# Patient Record
Sex: MALE | Race: BLACK OR AFRICAN AMERICAN | NOT HISPANIC OR LATINO | Employment: FULL TIME | URBAN - METROPOLITAN AREA
[De-identification: names, ages, dates, MRNs, and addresses within clinical notes are randomized per-mention and may not be internally consistent; named-entity substitution may affect disease eponyms.]

---

## 2022-02-16 ENCOUNTER — OFFICE VISIT (OUTPATIENT)
Dept: URGENT CARE | Facility: CLINIC | Age: 52
End: 2022-02-16
Payer: MEDICAID

## 2022-02-16 ENCOUNTER — APPOINTMENT (OUTPATIENT)
Dept: RADIOLOGY | Facility: IMAGING CENTER | Age: 52
End: 2022-02-16
Attending: PHYSICIAN ASSISTANT
Payer: MEDICAID

## 2022-02-16 VITALS
HEIGHT: 70 IN | HEART RATE: 83 BPM | RESPIRATION RATE: 20 BRPM | DIASTOLIC BLOOD PRESSURE: 80 MMHG | WEIGHT: 261.2 LBS | SYSTOLIC BLOOD PRESSURE: 120 MMHG | OXYGEN SATURATION: 98 % | TEMPERATURE: 97.6 F | BODY MASS INDEX: 37.39 KG/M2

## 2022-02-16 DIAGNOSIS — Z78.9 MALE-TO-FEMALE TRANSGENDER PERSON: ICD-10-CM

## 2022-02-16 DIAGNOSIS — J98.8 RTI (RESPIRATORY TRACT INFECTION): ICD-10-CM

## 2022-02-16 DIAGNOSIS — J44.1 COPD WITH ACUTE EXACERBATION (HCC): ICD-10-CM

## 2022-02-16 DIAGNOSIS — G47.30 SLEEP APNEA, UNSPECIFIED TYPE: ICD-10-CM

## 2022-02-16 DIAGNOSIS — M65.331 TRIGGER MIDDLE FINGER OF RIGHT HAND: ICD-10-CM

## 2022-02-16 PROCEDURE — 99204 OFFICE O/P NEW MOD 45 MIN: CPT | Performed by: PHYSICIAN ASSISTANT

## 2022-02-16 PROCEDURE — 71046 X-RAY EXAM CHEST 2 VIEWS: CPT | Mod: TC | Performed by: PHYSICIAN ASSISTANT

## 2022-02-16 RX ORDER — METHYLPREDNISOLONE 4 MG/1
TABLET ORAL
Qty: 21 TABLET | Refills: 0 | Status: SHIPPED | OUTPATIENT
Start: 2022-02-16 | End: 2022-04-29

## 2022-02-16 RX ORDER — DOXYCYCLINE HYCLATE 100 MG
100 TABLET ORAL 2 TIMES DAILY
Qty: 14 TABLET | Refills: 0 | Status: SHIPPED | OUTPATIENT
Start: 2022-02-16 | End: 2022-02-23

## 2022-02-16 ASSESSMENT — ENCOUNTER SYMPTOMS
NAUSEA: 0
SINUS PAIN: 0
ABDOMINAL PAIN: 0
SORE THROAT: 0
MYALGIAS: 0
VOMITING: 0
COUGH: 1
HEADACHES: 0
WHEEZING: 0
CHILLS: 0
HEMOPTYSIS: 0
DIARRHEA: 0
FEVER: 0
SHORTNESS OF BREATH: 1
RHINORRHEA: 0
SPUTUM PRODUCTION: 1

## 2022-02-16 ASSESSMENT — COPD QUESTIONNAIRES: COPD: 1

## 2022-02-16 NOTE — PROGRESS NOTES
Subjective     Mikal Aguero is a 51 y.o. adult who presents with Cough (Dry throat x 2 mos/) and Hand Pain ((R) hand, hard to bend x 1 year/)            Cough  This is a new problem. Episode onset: > 2 months  The problem has been gradually worsening. The cough is productive of purulent sputum (green, brown sputum). Associated symptoms include shortness of breath. Pertinent negatives include no chest pain, chills, ear pain, fever, headaches, hemoptysis, myalgias, nasal congestion, rash, rhinorrhea, sore throat or wheezing. Nothing aggravates the symptoms. Risk factors for lung disease include smoking/tobacco exposure. Treatments tried: started antibiotics Fiance had. The treatment provided mild relief. Mikal Aguero's past medical history is significant for COPD.     The patient also reports hand pain in his right middle finger for over 1 year. He reports history of being diagnosed with trigger finger. Finger will get stuck and become very painful. No known Injury. No numbness or tingling.    The patient also requested estrogen pills. He is male-female transgender and is interested in estrogen medication.     No past medical history on file.  No past surgical history on file.    No family history on file.    No Known Allergies    Medications, Allergies, and current problem list reviewed today in Epic      Review of Systems   Constitutional: Negative for chills, fever and malaise/fatigue.   HENT: Negative for congestion, ear pain, rhinorrhea, sinus pain and sore throat.    Respiratory: Positive for cough, sputum production and shortness of breath. Negative for hemoptysis and wheezing.         Night awakening    Cardiovascular: Negative for chest pain and leg swelling.   Gastrointestinal: Negative for abdominal pain, diarrhea, nausea and vomiting.   Musculoskeletal: Positive for joint pain (right middle finger pain  and locking ). Negative for myalgias.   Skin: Negative for rash.   Neurological: Negative for headaches.  "    All other systems reviewed and are negative.              Objective     /80   Pulse 83   Temp 36.4 °C (97.6 °F)   Resp 20   Ht 1.778 m (5' 10\")   Wt 118 kg (261 lb 3.2 oz)   SpO2 98%   BMI 37.48 kg/m²      Physical Exam  Constitutional:       General: Mikal Aguero is not in acute distress.     Appearance: Mikal Aguero is not ill-appearing.   HENT:      Head: Normocephalic and atraumatic.      Nose: Nose normal.      Mouth/Throat:      Mouth: Mucous membranes are moist.      Pharynx: No posterior oropharyngeal erythema.   Eyes:      Conjunctiva/sclera: Conjunctivae normal.   Cardiovascular:      Rate and Rhythm: Normal rate and regular rhythm.      Heart sounds: Normal heart sounds.   Pulmonary:      Effort: Pulmonary effort is normal. No respiratory distress.      Breath sounds: Normal breath sounds. No wheezing, rhonchi or rales.   Chest:      Chest wall: No tenderness.   Musculoskeletal:        Hands:    Skin:     General: Skin is warm and dry.   Neurological:      General: No focal deficit present.      Mental Status: Mikal Aguero is alert and oriented to person, place, and time.   Psychiatric:         Behavior: Behavior normal.         Thought Content: Thought content normal.         Judgment: Judgment normal.                Narrative & Impression     2/16/2022 10:24 AM     HISTORY/REASON FOR EXAM:  Shortness of breath     TECHNIQUE/EXAM DESCRIPTION:  PA and lateral views of the chest.     COMPARISON:  None     FINDINGS:     The heart is within normal limits in size. Atherosclerotic calcification is seen.  No focal consolidation, pleural effusion or pneumothorax is identified.  Costophrenic angles are clear.     IMPRESSION:     1.  No acute cardiopulmonary process is identified.  2.  Atherosclerotic plaque.             Assessment & Plan        1. COPD with acute exacerbation (HCC)  DX-CHEST-2 VIEWS    doxycycline (VIBRAMYCIN) 100 MG Tab    methylPREDNISolone (MEDROL DOSEPAK) 4 MG Tablet Therapy " Pack   2. Sleep apnea, unspecified type  Referral to Pulmonary and Sleep Medicine   3. Trigger middle finger of right hand  Referral to Orthopedics    methylPREDNISolone (MEDROL DOSEPAK) 4 MG Tablet Therapy Pack   4. Male-to-female transgender person  Referral to Endocrinology       X-RAY reviewed. Agree with RAD above.  RX Doxycycline and Medrol for COPD Exacerbation.   Advised patient to establish with PCP- number given.         Differential diagnoses, Supportive care, and indications for immediate follow-up discussed with patient.   Pathogenesis of diagnosis discussed including typical length and natural progression.   Instructed to return to clinic or nearest emergency department for any change in condition, further concerns, or worsening of symptoms.    The patient demonstrated a good understanding and agreed with the treatment plan.    Marilyn Fernandez P.A.-C.

## 2022-03-13 ENCOUNTER — APPOINTMENT (OUTPATIENT)
Dept: RADIOLOGY | Facility: IMAGING CENTER | Age: 52
End: 2022-03-13
Attending: NURSE PRACTITIONER
Payer: MEDICAID

## 2022-03-13 ENCOUNTER — OFFICE VISIT (OUTPATIENT)
Dept: URGENT CARE | Facility: CLINIC | Age: 52
End: 2022-03-13
Payer: MEDICAID

## 2022-03-13 VITALS
DIASTOLIC BLOOD PRESSURE: 78 MMHG | BODY MASS INDEX: 36.71 KG/M2 | HEIGHT: 71 IN | WEIGHT: 262.2 LBS | RESPIRATION RATE: 16 BRPM | OXYGEN SATURATION: 96 % | TEMPERATURE: 98.1 F | SYSTOLIC BLOOD PRESSURE: 136 MMHG | HEART RATE: 98 BPM

## 2022-03-13 DIAGNOSIS — M65.331 TRIGGER FINGER, RIGHT MIDDLE FINGER: ICD-10-CM

## 2022-03-13 DIAGNOSIS — M79.641 RIGHT HAND PAIN: ICD-10-CM

## 2022-03-13 PROCEDURE — 99214 OFFICE O/P EST MOD 30 MIN: CPT | Performed by: NURSE PRACTITIONER

## 2022-03-13 PROCEDURE — 73130 X-RAY EXAM OF HAND: CPT | Mod: TC,RT | Performed by: NURSE PRACTITIONER

## 2022-03-13 RX ORDER — PREDNISONE 20 MG/1
60 TABLET ORAL DAILY
Qty: 15 TABLET | Refills: 0 | Status: SHIPPED | OUTPATIENT
Start: 2022-03-13 | End: 2022-03-18

## 2022-03-13 ASSESSMENT — VISUAL ACUITY: OU: 1

## 2022-03-13 ASSESSMENT — ENCOUNTER SYMPTOMS
NEUROLOGICAL NEGATIVE: 1
CONSTITUTIONAL NEGATIVE: 1

## 2022-03-13 NOTE — PATIENT INSTRUCTIONS
Details    Reading Physician Reading Date Result Priority   Kamilah Neri D.O.  [phone] 3/13/2022 Urgent Care     Narrative & Impression     3/13/2022 11:39 AM     HISTORY/REASON FOR EXAM:  Pain/locking to the third finger..        TECHNIQUE/EXAM DESCRIPTION AND NUMBER OF VIEWS:  3 views of the  RIGHT hand.     COMPARISON: None     FINDINGS:     MINERALIZATION: Mineralization is unremarkable for age.     INJURY: No acute fracture or gross malalignment is seen.     JOINTS: No erosive arthropathy is evident.           IMPRESSION:     No radiographic evidence of acute osseous abnormality             Exam Ended: 03/13/22 11:46 AM Last Resulted: 03/13/22 11:48 AM               Trigger Finger    Trigger finger (stenosing tenosynovitis) is a condition that causes a finger to get stuck in a bent position. Each finger has a tough, cord-like tissue that connects muscle to bone (tendon), and each tendon is surrounded by a tunnel of tissue (tendon sheath). To move your finger, your tendon needs to slide freely through the sheath. Trigger finger happens when the tendon or the sheath thickens, making it difficult to move your finger.  Trigger finger can affect any finger or a thumb. It may affect more than one finger. Mild cases may clear up with rest and medicine. Severe cases require more treatment.  What are the causes?  Trigger finger is caused by a thickened finger tendon or tendon sheath. The cause of this thickening is not known.  What increases the risk?  The following factors may make you more likely to develop this condition:  · Doing activities that require a strong .  · Having rheumatoid arthritis, gout, or diabetes.  · Being 40-60 years old.  · Being a woman.  What are the signs or symptoms?  Symptoms of this condition include:  · Pain when bending or straightening your finger.  · Tenderness or swelling where your finger attaches to the palm of your hand.  · A lump in the palm of your hand or on the  inside of your finger.  · Hearing a popping sound when you try to straighten your finger.  · Feeling a popping, catching, or locking sensation when you try to straighten your finger.  · Being unable to straighten your finger.  How is this diagnosed?  This condition is diagnosed based on your symptoms and a physical exam.  How is this treated?  This condition may be treated by:  · Resting your finger and avoiding activities that make symptoms worse.  · Wearing a finger splint to keep your finger in a slightly bent position.  · Taking NSAIDs to relieve pain and swelling.  · Injecting medicine (steroids) into the tendon sheath to reduce swelling and irritation. Injections may need to be repeated.  · Having surgery to open the tendon sheath. This may be done if other treatments do not work and you cannot straighten your finger. You may need physical therapy after surgery.  Follow these instructions at home:    · Use moist heat to help reduce pain and swelling as told by your health care provider.  · Rest your finger and avoid activities that make pain worse. Return to normal activities as told by your health care provider.  · If you have a splint, wear it as told by your health care provider.  · Take over-the-counter and prescription medicines only as told by your health care provider.  · Keep all follow-up visits as told by your health care provider. This is important.  Contact a health care provider if:  · Your symptoms are not improving with home care.  Summary  · Trigger finger (stenosing tenosynovitis) causes your finger to get stuck in a bent position, and it can make it difficult and painful to straighten your finger.  · This condition develops when a finger tendon or tendon sheath thickens.  · Treatment starts with resting, wearing a splint, and taking NSAIDs.  · In severe cases, surgery to open the tendon sheath may be needed.  This information is not intended to replace advice given to you by your health care  provider. Make sure you discuss any questions you have with your health care provider.  Document Released: 10/07/2005 Document Revised: 11/30/2018 Document Reviewed: 11/28/2017  Elsevier Patient Education © 2020 [employer]        A referral request has been placed for hand surgery. We have a referrals department that is tasked with locating a suitable office that currently accepts patients and your insurance and you should be receiving referral information from them such as the office name, address, and number. This process usually takes around 3 business days. If you are not contacted by our referrals department, please call [phone].

## 2022-03-13 NOTE — PROGRESS NOTES
Subjective:     Mikal Aguero is a 51 y.o. adult who presents for Hand Pain (Not in the center of the hand, can't fully extend middle finger, throbbing pain, hurts to the touch x 1 year)       Other  This is a new problem. The problem has been gradually worsening.     Patient reports that for the past 1 year, patient started to develop pain, tenderness at the palm of the right hand at the base of the right middle finger.  Is worsening.  Has not been evaluated previously.  Prior therapy: None.  Right-hand-dominant.  Reports it is difficult to wipe self due to the pain.    Patient was screened prior to rooming and denied COVID-19 diagnosis or contact with a person who has been diagnosed or is suspected to have COVID-19. During this visit, appropriate PPE was worn, hand hygiene was performed, and the patient and any visitors were masked.     PMH:  has no past medical history on file.    MEDS:   Current Outpatient Medications:   •  predniSONE (DELTASONE) 20 MG Tab, Take 3 Tablets by mouth every day for 5 days., Disp: 15 Tablet, Rfl: 0  •  methylPREDNISolone (MEDROL DOSEPAK) 4 MG Tablet Therapy Pack, Follow schedule on package instructions. (Patient not taking: Reported on 3/13/2022), Disp: 21 Tablet, Rfl: 0    ALLERGIES: No Known Allergies    SURGHX: History reviewed. No pertinent surgical history.    SOCHX:  reports that Mikal Aguero has been smoking cigarettes. Mikal Ageuro has never used smokeless tobacco. Mikal Aguero reports current alcohol use. Mikal Aguero reports current drug use. Drugs: Marijuana and Inhaled.     FH: Reviewed with patient, not pertinent to this visit.    Review of Systems   Constitutional: Negative.    Musculoskeletal:        Right hand pain, tenderness at palm towards 3rd finger   Neurological: Negative.    All other systems reviewed and are negative.    Additional details per HPI.      Objective:     /78 (BP Location: Left arm, Patient Position: Sitting, BP Cuff Size: Large adult)   Pulse 98  "  Temp 36.7 °C (98.1 °F) (Temporal)   Resp 16   Ht 1.803 m (5' 11\")   Wt 119 kg (262 lb 3.2 oz)   SpO2 96%   BMI 36.57 kg/m²     Physical Exam  Vitals reviewed.   Constitutional:       General: Mikal Aguero is not in acute distress.     Appearance: Mikal Aguero is well-developed. Mikal Aguero is not ill-appearing or toxic-appearing.   Eyes:      General: Vision grossly intact.      Extraocular Movements: Extraocular movements intact.   Cardiovascular:      Rate and Rhythm: Normal rate.   Pulmonary:      Effort: Pulmonary effort is normal. No respiratory distress.   Musculoskeletal:         General: No deformity.      Right hand: Tenderness (Over volar aspect of right 3rd MCPJ) present. No swelling or deformity. Decreased range of motion (Right middle finger with flexion, due to pain). Normal strength. Normal sensation. Normal capillary refill.      Cervical back: Normal range of motion.   Skin:     General: Skin is warm and dry.      Capillary Refill: Capillary refill takes less than 2 seconds.      Coloration: Skin is not pale.   Neurological:      Mental Status: Mikal Aguero is alert and oriented to person, place, and time.      Sensory: No sensory deficit.      Motor: No weakness.   Psychiatric:         Behavior: Behavior normal. Behavior is cooperative.     X-ray of right hand:    Details    Reading Physician Reading Date Result Priority   Kamilah Neri D.O.  [phone] 3/13/2022 Urgent Care     Narrative & Impression     3/13/2022 11:39 AM     HISTORY/REASON FOR EXAM:  Pain/locking to the third finger..    TECHNIQUE/EXAM DESCRIPTION AND NUMBER OF VIEWS:  3 views of the  RIGHT hand.     COMPARISON: None     FINDINGS:     MINERALIZATION: Mineralization is unremarkable for age.     INJURY: No acute fracture or gross malalignment is seen.     JOINTS: No erosive arthropathy is evident.    IMPRESSION:     No radiographic evidence of acute osseous abnormality           Exam Ended: 03/13/22 11:46 AM Last " Resulted: 03/13/22 11:48 AM           Radiology report and images reviewed by myself. Concur with findings.      Assessment/Plan:     1. Right hand pain  - DX-HAND 3+ RIGHT; Future  - predniSONE (DELTASONE) 20 MG Tab; Take 3 Tablets by mouth every day for 5 days.  Dispense: 15 Tablet; Refill: 0  - Referral to Hand Surgery    2. Trigger finger, right middle finger  - predniSONE (DELTASONE) 20 MG Tab; Take 3 Tablets by mouth every day for 5 days.  Dispense: 15 Tablet; Refill: 0  - Referral to Hand Surgery    Rx as above sent electronically. Rest, ice/heat, elevate. Follow up with hand surgery for evaluation of right middle finger pain, stiffness; possible trigger finger/    Differential diagnosis, natural history, supportive care, over-the-counter symptom management per 's instructions, close monitoring, and indications for immediate follow-up discussed.     Warning signs reviewed. Return precautions discussed.     All questions answered. Patient agrees with the plan of care.    Discharge summary provided.

## 2022-04-20 ENCOUNTER — APPOINTMENT (OUTPATIENT)
Dept: RADIOLOGY | Facility: MEDICAL CENTER | Age: 52
End: 2022-04-20
Attending: EMERGENCY MEDICINE
Payer: MEDICAID

## 2022-04-20 ENCOUNTER — APPOINTMENT (OUTPATIENT)
Dept: RADIOLOGY | Facility: MEDICAL CENTER | Age: 52
End: 2022-04-20
Payer: MEDICAID

## 2022-04-20 ENCOUNTER — HOSPITAL ENCOUNTER (EMERGENCY)
Facility: MEDICAL CENTER | Age: 52
End: 2022-04-20
Attending: EMERGENCY MEDICINE
Payer: MEDICAID

## 2022-04-20 VITALS
HEART RATE: 97 BPM | HEIGHT: 71 IN | SYSTOLIC BLOOD PRESSURE: 136 MMHG | OXYGEN SATURATION: 97 % | BODY MASS INDEX: 36.4 KG/M2 | WEIGHT: 260 LBS | TEMPERATURE: 98.2 F | RESPIRATION RATE: 20 BRPM | DIASTOLIC BLOOD PRESSURE: 89 MMHG

## 2022-04-20 DIAGNOSIS — S80.12XA CONTUSION OF MULTIPLE SITES OF LEFT LOWER EXTREMITY, INITIAL ENCOUNTER: ICD-10-CM
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
## 2022-04-20 DIAGNOSIS — T14.8XXA ABRASION: ICD-10-CM

## 2022-04-20 PROBLEM — T14.90XA TRAUMA: Status: ACTIVE | Noted: 2022-04-20

## 2022-04-20 PROBLEM — Z11.52 ENCOUNTER FOR SCREENING FOR COVID-19: Status: ACTIVE | Noted: 2022-04-20

## 2022-04-20 PROBLEM — Z78.9 NO CONTRAINDICATION TO DEEP VEIN THROMBOSIS (DVT) PROPHYLAXIS: Status: ACTIVE | Noted: 2022-04-20

## 2022-04-20 PROBLEM — R73.9 HYPERGLYCEMIA: Status: ACTIVE | Noted: 2022-04-20

## 2022-04-20 PROBLEM — S87.80XA: Status: ACTIVE | Noted: 2022-04-20

## 2022-04-20 PROBLEM — R55 VASOVAGAL SYNCOPE: Status: ACTIVE | Noted: 2022-04-20

## 2022-04-20 LAB
ABO + RH BLD: NORMAL
ABO GROUP BLD: NORMAL
ALBUMIN SERPL BCP-MCNC: 4.4 G/DL (ref 3.2–4.9)
ALBUMIN/GLOB SERPL: 1.5 G/DL
ALP SERPL-CCNC: 88 U/L (ref 30–99)
ALT SERPL-CCNC: 20 U/L (ref 2–50)
ANION GAP SERPL CALC-SCNC: 14 MMOL/L (ref 7–16)
APTT PPP: <20 SEC (ref 24.7–36)
AST SERPL-CCNC: 23 U/L (ref 12–45)
BILIRUB SERPL-MCNC: 0.3 MG/DL (ref 0.1–1.5)
BLD GP AB SCN SERPL QL: NORMAL
BUN SERPL-MCNC: 12 MG/DL (ref 8–22)
CALCIUM SERPL-MCNC: 8.8 MG/DL (ref 8.5–10.5)
CHLORIDE SERPL-SCNC: 102 MMOL/L (ref 96–112)
CK SERPL-CCNC: 303 U/L (ref 0–154)
CK SERPL-CCNC: 324 U/L (ref 0–154)
CO2 SERPL-SCNC: 21 MMOL/L (ref 20–33)
CREAT SERPL-MCNC: 0.98 MG/DL (ref 0.5–1.4)
EKG IMPRESSION: NORMAL
ERYTHROCYTE [DISTWIDTH] IN BLOOD BY AUTOMATED COUNT: 41.1 FL (ref 35.9–50)
EST. AVERAGE GLUCOSE BLD GHB EST-MCNC: 157 MG/DL
ETHANOL BLD-MCNC: <10.1 MG/DL (ref 0–10)
GFR SERPLBLD CREATININE-BSD FMLA CKD-EPI: 93 ML/MIN/1.73 M 2
GLOBULIN SER CALC-MCNC: 2.9 G/DL (ref 1.9–3.5)
GLUCOSE SERPL-MCNC: 240 MG/DL (ref 65–99)
HBA1C MFR BLD: 7.1 % (ref 4–5.6)
HCT VFR BLD AUTO: 47.4 % (ref 42–52)
HGB BLD-MCNC: 15.7 G/DL (ref 14–18)
INR PPP: 1.07 (ref 0.87–1.13)
MCH RBC QN AUTO: 28.7 PG (ref 27–33)
MCHC RBC AUTO-ENTMCNC: 33.1 G/DL (ref 33.7–35.3)
MCV RBC AUTO: 86.7 FL (ref 81.4–97.8)
PLATELET # BLD AUTO: 264 K/UL (ref 164–446)
PMV BLD AUTO: 9.9 FL (ref 9–12.9)
POTASSIUM SERPL-SCNC: 4.5 MMOL/L (ref 3.6–5.5)
PROT SERPL-MCNC: 7.3 G/DL (ref 6–8.2)
PROTHROMBIN TIME: 13.6 SEC (ref 12–14.6)
RBC # BLD AUTO: 5.47 M/UL (ref 4.7–6.1)
RH BLD: NORMAL
SODIUM SERPL-SCNC: 137 MMOL/L (ref 135–145)
WBC # BLD AUTO: 8.9 K/UL (ref 4.8–10.8)

## 2022-04-20 PROCEDURE — 90471 IMMUNIZATION ADMIN: CPT

## 2022-04-20 PROCEDURE — 700102 HCHG RX REV CODE 250 W/ 637 OVERRIDE(OP): Performed by: EMERGENCY MEDICINE

## 2022-04-20 PROCEDURE — 82550 ASSAY OF CK (CPK): CPT

## 2022-04-20 PROCEDURE — 85610 PROTHROMBIN TIME: CPT

## 2022-04-20 PROCEDURE — 99284 EMERGENCY DEPT VISIT MOD MDM: CPT | Performed by: SURGERY

## 2022-04-20 PROCEDURE — 72170 X-RAY EXAM OF PELVIS: CPT

## 2022-04-20 PROCEDURE — 86901 BLOOD TYPING SEROLOGIC RH(D): CPT

## 2022-04-20 PROCEDURE — 85027 COMPLETE CBC AUTOMATED: CPT

## 2022-04-20 PROCEDURE — 93005 ELECTROCARDIOGRAM TRACING: CPT | Performed by: NURSE PRACTITIONER

## 2022-04-20 PROCEDURE — 305949 HCHG RED TRAUMA ACT PRE-NOTIFY NO CC

## 2022-04-20 PROCEDURE — 73590 X-RAY EXAM OF LOWER LEG: CPT | Mod: LT

## 2022-04-20 PROCEDURE — 90715 TDAP VACCINE 7 YRS/> IM: CPT | Performed by: EMERGENCY MEDICINE

## 2022-04-20 PROCEDURE — 99285 EMERGENCY DEPT VISIT HI MDM: CPT

## 2022-04-20 PROCEDURE — 700111 HCHG RX REV CODE 636 W/ 250 OVERRIDE (IP): Performed by: EMERGENCY MEDICINE

## 2022-04-20 PROCEDURE — 82077 ASSAY SPEC XCP UR&BREATH IA: CPT

## 2022-04-20 PROCEDURE — 73590 X-RAY EXAM OF LOWER LEG: CPT | Mod: RT

## 2022-04-20 PROCEDURE — 86850 RBC ANTIBODY SCREEN: CPT

## 2022-04-20 PROCEDURE — 83036 HEMOGLOBIN GLYCOSYLATED A1C: CPT

## 2022-04-20 PROCEDURE — 85730 THROMBOPLASTIN TIME PARTIAL: CPT

## 2022-04-20 PROCEDURE — 36415 COLL VENOUS BLD VENIPUNCTURE: CPT

## 2022-04-20 PROCEDURE — 71045 X-RAY EXAM CHEST 1 VIEW: CPT

## 2022-04-20 PROCEDURE — A9270 NON-COVERED ITEM OR SERVICE: HCPCS | Performed by: EMERGENCY MEDICINE

## 2022-04-20 PROCEDURE — 80053 COMPREHEN METABOLIC PANEL: CPT

## 2022-04-20 PROCEDURE — 86900 BLOOD TYPING SEROLOGIC ABO: CPT

## 2022-04-20 RX ORDER — OXYCODONE HYDROCHLORIDE AND ACETAMINOPHEN 5; 325 MG/1; MG/1
1 TABLET ORAL EVERY 6 HOURS PRN
Qty: 12 TABLET | Refills: 0 | Status: SHIPPED | OUTPATIENT
Start: 2022-04-20 | End: 2022-04-23

## 2022-04-20 RX ORDER — OXYCODONE HYDROCHLORIDE AND ACETAMINOPHEN 5; 325 MG/1; MG/1
1 TABLET ORAL ONCE
Status: COMPLETED | OUTPATIENT
Start: 2022-04-20 | End: 2022-04-20

## 2022-04-20 RX ADMIN — OXYCODONE HYDROCHLORIDE AND ACETAMINOPHEN 1 TABLET: 5; 325 TABLET ORAL at 19:28

## 2022-04-20 RX ADMIN — CLOSTRIDIUM TETANI TOXOID ANTIGEN (FORMALDEHYDE INACTIVATED), CORYNEBACTERIUM DIPHTHERIAE TOXOID ANTIGEN (FORMALDEHYDE INACTIVATED), BORDETELLA PERTUSSIS TOXOID ANTIGEN (GLUTARALDEHYDE INACTIVATED), BORDETELLA PERTUSSIS FILAMENTOUS HEMAGGLUTININ ANTIGEN (FORMALDEHYDE INACTIVATED), BORDETELLA PERTUSSIS PERTACTIN ANTIGEN, AND BORDETELLA PERTUSSIS FIMBRIAE 2/3 ANTIGEN 0.5 ML: 5; 2; 2.5; 5; 3; 5 INJECTION, SUSPENSION INTRAMUSCULAR at 17:45

## 2022-04-20 ASSESSMENT — PAIN DESCRIPTION - PAIN TYPE: TYPE: ACUTE PAIN

## 2022-04-21 NOTE — ED NOTES
Assumed report and patient care from Ester trauma RN. Patient is resting comfortably in [facility] in hallway with no signs of labored breathing or restlessness. POC discussed. No questions or concerns at this time. Whiteboard updated. Safety measures in place.
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
## 2022-04-21 NOTE — ED NOTES
Report to CECIL Tillman   HPI: 53 year old man with hx of alcohol/polysubstance abuse admitted for hypokalemia. 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 Patient placed on Depakote 500mg BID. Primary team and ID concerned about Depakote interacting with current antibiotics. Never placed on Keppra in the past. No headache or confusion at this time. No seizures noted during this admission.     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 Follows commands. No dysarthria. No aphasia. EOM intact. PERRL. Moving all four extremities. Finger to nose and heel to shin intact. Tongue is midline. Palate elevates symmetrically. Shoulder shrug is intact. Reflexes symmetric.     MRI IAC and labs reviewed  NIHSS- 0

## 2022-04-21 NOTE — ED NOTES
Patient discharged from [facility] ED to home with spouse. Discharge teaching completed at bedside and patient signature obtained. Consent signed for opiate prescription. All questions and concerns addressed. PIV removed. All pt belongings with pt at time of discharge.
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
## 2022-04-21 NOTE — DISCHARGE INSTRUCTIONS
Please use the pain medication as needed.  Ensure you rest and stay well-hydrated.  Please seek medical attention for worsening or increasing pain, numbness or tingling in your feet.
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
## 2022-04-21 NOTE — ASSESSMENT & PLAN NOTE
Elevated blood glucose on arrival.   No known history of DM.  Fingerstick's AC/HS   Insulin sliding scale  Hgb A1c pending.
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
## 2022-04-21 NOTE — ASSESSMENT & PLAN NOTE
4/20 COVID-19 specimen sent. AIRBORNE & CONTACT/EYE ISOLATION implemented pending final SARS-CoV-2 testing.

## 2022-04-29 ENCOUNTER — OFFICE VISIT (OUTPATIENT)
Dept: URGENT CARE | Facility: CLINIC | Age: 52
End: 2022-04-29
Payer: MEDICAID

## 2022-04-29 VITALS
DIASTOLIC BLOOD PRESSURE: 76 MMHG | OXYGEN SATURATION: 98 % | RESPIRATION RATE: 16 BRPM | BODY MASS INDEX: 36.72 KG/M2 | TEMPERATURE: 98 F | WEIGHT: 262.3 LBS | HEART RATE: 100 BPM | SYSTOLIC BLOOD PRESSURE: 116 MMHG | HEIGHT: 71 IN

## 2022-04-29 DIAGNOSIS — M79.89 PAIN AND SWELLING OF RIGHT LOWER LEG: ICD-10-CM

## 2022-04-29 DIAGNOSIS — S80.12XA HEMATOMA OF LEFT LOWER LEG: ICD-10-CM

## 2022-04-29 DIAGNOSIS — L03.115 CELLULITIS OF RIGHT LOWER LEG: ICD-10-CM

## 2022-04-29 DIAGNOSIS — M79.89 PAIN AND SWELLING OF LOWER LEG, LEFT: ICD-10-CM

## 2022-04-29 DIAGNOSIS — M79.662 PAIN AND SWELLING OF LOWER LEG, LEFT: ICD-10-CM

## 2022-04-29 DIAGNOSIS — M79.661 PAIN AND SWELLING OF RIGHT LOWER LEG: ICD-10-CM

## 2022-04-29 PROCEDURE — 99214 OFFICE O/P EST MOD 30 MIN: CPT | Performed by: NURSE PRACTITIONER

## 2022-04-29 RX ORDER — DOXYCYCLINE HYCLATE 100 MG
100 TABLET ORAL 2 TIMES DAILY
Qty: 14 TABLET | Refills: 0 | Status: SHIPPED | OUTPATIENT
Start: 2022-04-29 | End: 2022-05-06

## 2022-04-29 ASSESSMENT — ENCOUNTER SYMPTOMS
SORE THROAT: 0
SHORTNESS OF BREATH: 0
DIZZINESS: 0
CHILLS: 0
EYE PAIN: 0
FEVER: 0
NAUSEA: 0
MYALGIAS: 0
VOMITING: 0

## 2022-04-29 NOTE — LETTER
April 29, 2022         Patient: Mikal Aguero   YOB: 1970   Date of Visit: 4/29/2022           To Whom it May Concern:    Mikal Aguero was seen in my clinic on 4/29/2022. He may return to work on 5/2/22.    If you have any questions or concerns, please don't hesitate to call.        Sincerely,           MERISSA Otero  Electronically Signed
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
## 2022-05-02 ENCOUNTER — TELEPHONE (OUTPATIENT)
Dept: SCHEDULING | Facility: IMAGING CENTER | Age: 52
End: 2022-05-02

## 2022-05-09 SDOH — ECONOMIC STABILITY: TRANSPORTATION INSECURITY
IN THE PAST 12 MONTHS, HAS THE LACK OF TRANSPORTATION KEPT YOU FROM MEDICAL APPOINTMENTS OR FROM GETTING MEDICATIONS?: NO

## 2022-05-09 SDOH — ECONOMIC STABILITY: FOOD INSECURITY: WITHIN THE PAST 12 MONTHS, YOU WORRIED THAT YOUR FOOD WOULD RUN OUT BEFORE YOU GOT MONEY TO BUY MORE.: SOMETIMES TRUE

## 2022-05-09 SDOH — ECONOMIC STABILITY: FOOD INSECURITY: WITHIN THE PAST 12 MONTHS, THE FOOD YOU BOUGHT JUST DIDN'T LAST AND YOU DIDN'T HAVE MONEY TO GET MORE.: SOMETIMES TRUE

## 2022-05-09 SDOH — ECONOMIC STABILITY: TRANSPORTATION INSECURITY
IN THE PAST 12 MONTHS, HAS LACK OF TRANSPORTATION KEPT YOU FROM MEETINGS, WORK, OR FROM GETTING THINGS NEEDED FOR DAILY LIVING?: NO

## 2022-05-09 SDOH — HEALTH STABILITY: PHYSICAL HEALTH: ON AVERAGE, HOW MANY MINUTES DO YOU ENGAGE IN EXERCISE AT THIS LEVEL?: 30 MIN

## 2022-05-09 SDOH — ECONOMIC STABILITY: INCOME INSECURITY: HOW HARD IS IT FOR YOU TO PAY FOR THE VERY BASICS LIKE FOOD, HOUSING, MEDICAL CARE, AND HEATING?: SOMEWHAT HARD

## 2022-05-09 SDOH — ECONOMIC STABILITY: HOUSING INSECURITY
IN THE LAST 12 MONTHS, WAS THERE A TIME WHEN YOU DID NOT HAVE A STEADY PLACE TO SLEEP OR SLEPT IN A SHELTER (INCLUDING NOW)?: YES

## 2022-05-09 SDOH — ECONOMIC STABILITY: INCOME INSECURITY: IN THE LAST 12 MONTHS, WAS THERE A TIME WHEN YOU WERE NOT ABLE TO PAY THE MORTGAGE OR RENT ON TIME?: YES

## 2022-05-09 SDOH — HEALTH STABILITY: PHYSICAL HEALTH: ON AVERAGE, HOW MANY DAYS PER WEEK DO YOU ENGAGE IN MODERATE TO STRENUOUS EXERCISE (LIKE A BRISK WALK)?: 5 DAYS

## 2022-05-09 ASSESSMENT — LIFESTYLE VARIABLES
HOW OFTEN DO YOU HAVE SIX OR MORE DRINKS ON ONE OCCASION: LESS THAN MONTHLY
HOW MANY STANDARD DRINKS CONTAINING ALCOHOL DO YOU HAVE ON A TYPICAL DAY: 1 OR 2
HOW OFTEN DO YOU HAVE A DRINK CONTAINING ALCOHOL: MONTHLY OR LESS

## 2022-05-09 ASSESSMENT — SOCIAL DETERMINANTS OF HEALTH (SDOH)
DO YOU BELONG TO ANY CLUBS OR ORGANIZATIONS SUCH AS CHURCH GROUPS UNIONS, FRATERNAL OR ATHLETIC GROUPS, OR SCHOOL GROUPS?: NO
HOW OFTEN DO YOU GET TOGETHER WITH FRIENDS OR RELATIVES?: MORE THAN THREE TIMES A WEEK
HOW OFTEN DO YOU ATTEND CHURCH OR RELIGIOUS SERVICES?: 1 TO 4 TIMES PER YEAR
IN A TYPICAL WEEK, HOW MANY TIMES DO YOU TALK ON THE PHONE WITH FAMILY, FRIENDS, OR NEIGHBORS?: ONCE A WEEK
HOW OFTEN DO YOU ATTENT MEETINGS OF THE CLUB OR ORGANIZATION YOU BELONG TO?: NEVER
ARE YOU MARRIED, WIDOWED, DIVORCED, SEPARATED, NEVER MARRIED, OR LIVING WITH A PARTNER?: LIVING WITH PARTNER

## 2022-05-10 SDOH — HEALTH STABILITY: PHYSICAL HEALTH: ON AVERAGE, HOW MANY DAYS PER WEEK DO YOU ENGAGE IN MODERATE TO STRENUOUS EXERCISE (LIKE A BRISK WALK)?: 5 DAYS

## 2022-05-10 SDOH — ECONOMIC STABILITY: HOUSING INSECURITY

## 2022-05-10 SDOH — HEALTH STABILITY: MENTAL HEALTH
STRESS IS WHEN SOMEONE FEELS TENSE, NERVOUS, ANXIOUS, OR CAN'T SLEEP AT NIGHT BECAUSE THEIR MIND IS TROUBLED. HOW STRESSED ARE YOU?: RATHER MUCH

## 2022-05-10 SDOH — ECONOMIC STABILITY: TRANSPORTATION INSECURITY
IN THE PAST 12 MONTHS, HAS LACK OF RELIABLE TRANSPORTATION KEPT YOU FROM MEDICAL APPOINTMENTS, MEETINGS, WORK OR FROM GETTING THINGS NEEDED FOR DAILY LIVING?: NO

## 2022-05-10 SDOH — HEALTH STABILITY: PHYSICAL HEALTH: ON AVERAGE, HOW MANY MINUTES DO YOU ENGAGE IN EXERCISE AT THIS LEVEL?: 30 MIN

## 2022-05-10 ASSESSMENT — SOCIAL DETERMINANTS OF HEALTH (SDOH)
DO YOU BELONG TO ANY CLUBS OR ORGANIZATIONS SUCH AS CHURCH GROUPS UNIONS, FRATERNAL OR ATHLETIC GROUPS, OR SCHOOL GROUPS?: NO
HOW OFTEN DO YOU GET TOGETHER WITH FRIENDS OR RELATIVES?: MORE THAN THREE TIMES A WEEK
HOW MANY DRINKS CONTAINING ALCOHOL DO YOU HAVE ON A TYPICAL DAY WHEN YOU ARE DRINKING: 1 OR 2
HOW HARD IS IT FOR YOU TO PAY FOR THE VERY BASICS LIKE FOOD, HOUSING, MEDICAL CARE, AND HEATING?: SOMEWHAT HARD
HOW OFTEN DO YOU ATTENT MEETINGS OF THE CLUB OR ORGANIZATION YOU BELONG TO?: NEVER
HOW OFTEN DO YOU HAVE A DRINK CONTAINING ALCOHOL: MONTHLY OR LESS
ARE YOU MARRIED, WIDOWED, DIVORCED, SEPARATED, NEVER MARRIED, OR LIVING WITH A PARTNER?: LIVING WITH PARTNER
IN A TYPICAL WEEK, HOW MANY TIMES DO YOU TALK ON THE PHONE WITH FAMILY, FRIENDS, OR NEIGHBORS?: ONCE A WEEK
WITHIN THE PAST 12 MONTHS, YOU WORRIED THAT YOUR FOOD WOULD RUN OUT BEFORE YOU GOT THE MONEY TO BUY MORE: SOMETIMES TRUE
HOW OFTEN DO YOU ATTEND CHURCH OR RELIGIOUS SERVICES?: 1 TO 4 TIMES PER YEAR
HOW OFTEN DO YOU HAVE SIX OR MORE DRINKS ON ONE OCCASION: LESS THAN MONTHLY

## 2022-05-17 ENCOUNTER — OFFICE VISIT (OUTPATIENT)
Dept: MEDICAL GROUP | Facility: MEDICAL CENTER | Age: 52
End: 2022-05-17
Attending: NURSE PRACTITIONER
Payer: MEDICAID

## 2022-05-17 ENCOUNTER — HOSPITAL ENCOUNTER (OUTPATIENT)
Facility: MEDICAL CENTER | Age: 52
End: 2022-05-17
Attending: PHYSICIAN ASSISTANT
Payer: MEDICAID

## 2022-05-17 VITALS
HEIGHT: 70 IN | DIASTOLIC BLOOD PRESSURE: 70 MMHG | HEART RATE: 101 BPM | WEIGHT: 268.5 LBS | TEMPERATURE: 98.3 F | BODY MASS INDEX: 38.44 KG/M2 | SYSTOLIC BLOOD PRESSURE: 110 MMHG | RESPIRATION RATE: 17 BRPM | OXYGEN SATURATION: 97 %

## 2022-05-17 DIAGNOSIS — L97.319 LOWER LIMB ULCER, ANKLE, RIGHT, WITH UNSPECIFIED SEVERITY (HCC): ICD-10-CM

## 2022-05-17 PROCEDURE — 87077 CULTURE AEROBIC IDENTIFY: CPT | Mod: 91

## 2022-05-17 PROCEDURE — 99202 OFFICE O/P NEW SF 15 MIN: CPT | Performed by: PHYSICIAN ASSISTANT

## 2022-05-17 PROCEDURE — 700111 HCHG RX REV CODE 636 W/ 250 OVERRIDE (IP): Performed by: PHYSICIAN ASSISTANT

## 2022-05-17 PROCEDURE — 87205 SMEAR GRAM STAIN: CPT

## 2022-05-17 PROCEDURE — 99203 OFFICE O/P NEW LOW 30 MIN: CPT | Performed by: PHYSICIAN ASSISTANT

## 2022-05-17 PROCEDURE — 87070 CULTURE OTHR SPECIMN AEROBIC: CPT

## 2022-05-17 RX ORDER — KETOROLAC TROMETHAMINE 30 MG/ML
30 INJECTION, SOLUTION INTRAMUSCULAR; INTRAVENOUS ONCE
Status: COMPLETED | OUTPATIENT
Start: 2022-05-17 | End: 2022-05-17

## 2022-05-17 RX ORDER — MELOXICAM 15 MG/1
15 TABLET ORAL DAILY
Qty: 30 TABLET | Refills: 2 | Status: SHIPPED | OUTPATIENT
Start: 2022-05-17 | End: 2022-05-31

## 2022-05-17 RX ORDER — AMOXICILLIN 500 MG/1
500 CAPSULE ORAL 3 TIMES DAILY
Qty: 15 CAPSULE | Refills: 0 | Status: SHIPPED | OUTPATIENT
Start: 2022-05-17 | End: 2022-05-22

## 2022-05-17 RX ORDER — ACETAMINOPHEN 500 MG
1000 TABLET ORAL EVERY 8 HOURS
Qty: 90 TABLET | Refills: 2 | Status: SHIPPED | OUTPATIENT
Start: 2022-05-17 | End: 2022-05-31

## 2022-05-17 RX ORDER — SULFAMETHOXAZOLE AND TRIMETHOPRIM 800; 160 MG/1; MG/1
1 TABLET ORAL 2 TIMES DAILY
Qty: 10 TABLET | Refills: 0 | Status: SHIPPED | OUTPATIENT
Start: 2022-05-17 | End: 2022-05-22

## 2022-05-17 RX ADMIN — KETOROLAC TROMETHAMINE 30 MG: 30 INJECTION, SOLUTION INTRAMUSCULAR at 11:07

## 2022-05-17 ASSESSMENT — PATIENT HEALTH QUESTIONNAIRE - PHQ9
5. POOR APPETITE OR OVEREATING: 0 - NOT AT ALL
CLINICAL INTERPRETATION OF PHQ2 SCORE: 4
SUM OF ALL RESPONSES TO PHQ QUESTIONS 1-9: 14

## 2022-05-17 ASSESSMENT — FIBROSIS 4 INDEX: FIB4 SCORE: 0.99
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
## 2022-05-18 LAB
AMBIGUOUS DTTM AMBI4: NORMAL
GRAM STN SPEC: NORMAL
SIGNIFICANT IND 70042: NORMAL
SIGNIFICANT IND 70042: NORMAL
SITE SITE: NORMAL
SITE SITE: NORMAL
SOURCE SOURCE: NORMAL
SOURCE SOURCE: NORMAL

## 2022-05-21 LAB
BACTERIA WND AEROBE CULT: ABNORMAL
GRAM STN SPEC: ABNORMAL
SIGNIFICANT IND 70042: ABNORMAL
SITE SITE: ABNORMAL
SOURCE SOURCE: ABNORMAL

## 2022-05-25 ENCOUNTER — OFFICE VISIT (OUTPATIENT)
Dept: WOUND CARE | Facility: MEDICAL CENTER | Age: 52
End: 2022-05-25
Attending: PHYSICIAN ASSISTANT
Payer: MEDICAID

## 2022-05-25 DIAGNOSIS — L97.319 LOWER LIMB ULCER, ANKLE, RIGHT, WITH UNSPECIFIED SEVERITY (HCC): ICD-10-CM

## 2022-05-25 NOTE — WOUND TEAM
"Per JEFF Mascorro, patient late cancelled for today's new evaluation appt stating, \"my car won't start.\"  "

## 2022-05-31 ENCOUNTER — OFFICE VISIT (OUTPATIENT)
Dept: MEDICAL GROUP | Facility: MEDICAL CENTER | Age: 52
End: 2022-05-31
Attending: PHYSICIAN ASSISTANT
Payer: MEDICAID

## 2022-05-31 VITALS
HEIGHT: 70 IN | DIASTOLIC BLOOD PRESSURE: 94 MMHG | SYSTOLIC BLOOD PRESSURE: 130 MMHG | TEMPERATURE: 98.8 F | OXYGEN SATURATION: 100 % | WEIGHT: 264 LBS | HEART RATE: 84 BPM | BODY MASS INDEX: 37.8 KG/M2 | RESPIRATION RATE: 17 BRPM

## 2022-05-31 DIAGNOSIS — G47.33 OBSTRUCTIVE SLEEP APNEA: ICD-10-CM

## 2022-05-31 DIAGNOSIS — L97.319 LOWER LIMB ULCER, ANKLE, RIGHT, WITH UNSPECIFIED SEVERITY (HCC): ICD-10-CM

## 2022-05-31 DIAGNOSIS — N52.9 ERECTILE DYSFUNCTION, UNSPECIFIED ERECTILE DYSFUNCTION TYPE: ICD-10-CM

## 2022-05-31 PROCEDURE — 99213 OFFICE O/P EST LOW 20 MIN: CPT | Performed by: PHYSICIAN ASSISTANT

## 2022-05-31 RX ORDER — SILDENAFIL 50 MG/1
50 TABLET, FILM COATED ORAL
Qty: 10 TABLET | Refills: 3 | Status: SHIPPED | OUTPATIENT
Start: 2022-05-31

## 2022-05-31 ASSESSMENT — FIBROSIS 4 INDEX: FIB4 SCORE: 1.01

## 2022-05-31 NOTE — ASSESSMENT & PLAN NOTE
Mikal presents today for follow up. He reports difficulty with initiating and maintaining an erection for the past 3.5 years. He reports associated fatigue and lack of motivation. This has never been worked up in the past. He is interested in trying Viagra or Cialis.

## 2022-05-31 NOTE — ASSESSMENT & PLAN NOTE
Mikal presents today for follow up. He reports that he completed is double antibiotic course and has been staying up on his wound care. He was late to his wound care appointment last week and therefore, had to be rescheduled to 6/7/22.
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
## 2022-05-31 NOTE — ASSESSMENT & PLAN NOTE
Mikal presents today for follow up. He reports a history of PAUL. He had a sleep study performed in 2014 and was prescribed a CPAP. He reports that he was in assisted at the time and once released had difficulty obtaining the machine. Symptoms include: Apneic episodes, loud snoring and non-restorative sleep.

## 2023-02-08 ENCOUNTER — OFFICE VISIT (OUTPATIENT)
Dept: URGENT CARE | Facility: CLINIC | Age: 53
End: 2023-02-08
Payer: MEDICAID

## 2023-02-08 VITALS
OXYGEN SATURATION: 97 % | WEIGHT: 265.3 LBS | SYSTOLIC BLOOD PRESSURE: 118 MMHG | BODY MASS INDEX: 37.98 KG/M2 | HEIGHT: 70 IN | RESPIRATION RATE: 18 BRPM | DIASTOLIC BLOOD PRESSURE: 80 MMHG | TEMPERATURE: 97 F | HEART RATE: 103 BPM

## 2023-02-08 DIAGNOSIS — J02.9 PHARYNGITIS, UNSPECIFIED ETIOLOGY: ICD-10-CM

## 2023-02-08 DIAGNOSIS — Z20.818 EXPOSURE TO STREP THROAT: ICD-10-CM

## 2023-02-08 LAB — S PYO DNA SPEC NAA+PROBE: NOT DETECTED

## 2023-02-08 PROCEDURE — 99214 OFFICE O/P EST MOD 30 MIN: CPT | Performed by: PHYSICIAN ASSISTANT

## 2023-02-08 PROCEDURE — 87651 STREP A DNA AMP PROBE: CPT | Performed by: PHYSICIAN ASSISTANT

## 2023-02-08 RX ORDER — ALBUTEROL SULFATE 90 UG/1
2 AEROSOL, METERED RESPIRATORY (INHALATION) EVERY 6 HOURS PRN
Qty: 8.5 G | Refills: 0 | Status: SHIPPED | OUTPATIENT
Start: 2023-02-08

## 2023-02-08 RX ORDER — PREDNISONE 10 MG/1
40 TABLET ORAL DAILY
Qty: 20 TABLET | Refills: 0 | Status: SHIPPED | OUTPATIENT
Start: 2023-02-08 | End: 2023-02-13

## 2023-02-08 RX ORDER — AMOXICILLIN 500 MG/1
500 CAPSULE ORAL 2 TIMES DAILY
Qty: 20 CAPSULE | Refills: 0 | Status: SHIPPED | OUTPATIENT
Start: 2023-02-08 | End: 2023-02-18

## 2023-02-08 ASSESSMENT — FIBROSIS 4 INDEX: FIB4 SCORE: 0.81

## 2023-02-09 ASSESSMENT — ENCOUNTER SYMPTOMS
NAUSEA: 0
MYALGIAS: 1
ABDOMINAL PAIN: 0
WHEEZING: 1
HEADACHES: 0
FEVER: 1
DIARRHEA: 0
SORE THROAT: 1
SHORTNESS OF BREATH: 0
COUGH: 1
WEAKNESS: 1
CONSTIPATION: 0
VOMITING: 0
CHILLS: 0
EYE PAIN: 0

## 2023-02-09 NOTE — PROGRESS NOTES
"Subjective:   Mikal Aguero is a 52 y.o. male who presents for Cough (X 2 weeks with congestion, wheezing, chills, sore throat and fever.  Hx of COPD. )      52-year-old male presents to urgent care, notes that his girlfriend's 2 children tested positive for strep and has had a 2-day history of worsening sore throat.  He notes that he has had around 2 weeks of cough and congestion.  He has a history of COPD, was seen previously but does not currently have an albuterol inhaler.  The cough medicine he was using helped slightly.  He has noted a tactile fever worse the first few days and then yesterday when the sore throat emerged.  Currently he denies difficulty breathing.  He does note some wheezes intermittently.  Previous medical history is notable for previous evaluation for weeks to months of ataxia, patient left without having full evaluation done at [facility].    Review of Systems   Constitutional:  Positive for fever and malaise/fatigue. Negative for chills.   HENT:  Positive for congestion and sore throat. Negative for ear pain.    Eyes:  Negative for pain.   Respiratory:  Positive for cough and wheezing. Negative for shortness of breath.    Cardiovascular:  Negative for chest pain.   Gastrointestinal:  Negative for abdominal pain, constipation, diarrhea, nausea and vomiting.   Genitourinary:  Negative for dysuria.   Musculoskeletal:  Positive for myalgias.   Skin:  Negative for rash.   Neurological:  Positive for weakness. Negative for headaches.     Medications, Allergies, and current problem list reviewed today in Epic.     Objective:     /80   Pulse (!) 103   Temp 36.1 °C (97 °F) (Temporal)   Resp 18   Ht 1.778 m (5' 10\")   Wt 120 kg (265 lb 4.8 oz)   SpO2 97%     Physical Exam  Vitals reviewed.   Constitutional:       Appearance: Normal appearance.   HENT:      Head: Normocephalic and atraumatic.      Right Ear: Tympanic membrane, ear canal and external ear normal.      Left Ear: " Tympanic membrane, ear canal and external ear normal.      Nose: Rhinorrhea present.      Mouth/Throat:      Mouth: Mucous membranes are moist.      Pharynx: Posterior oropharyngeal erythema present.   Eyes:      Conjunctiva/sclera: Conjunctivae normal.   Cardiovascular:      Rate and Rhythm: Normal rate and regular rhythm.   Pulmonary:      Effort: Pulmonary effort is normal.      Comments: Decreased air movement, symmetrical chest expansion  Musculoskeletal:      Cervical back: Normal range of motion.   Lymphadenopathy:      Cervical: No cervical adenopathy.   Skin:     General: Skin is warm and dry.      Capillary Refill: Capillary refill takes less than 2 seconds.   Neurological:      Mental Status: He is alert and oriented to person, place, and time.      Comments: Able to ambulate       Assessment/Plan:     Diagnosis and associated orders:     1. Pharyngitis, unspecified etiology  POCT CEPHEID GROUP A STREP - PCR      2. Exposure to strep throat  predniSONE (DELTASONE) 10 MG Tab    albuterol 108 (90 Base) MCG/ACT Aero Soln inhalation aerosol    amoxicillin (AMOXIL) 500 MG Cap         Comments/MDM:     Strongly recommended patient consider ER evaluation and hospitalization as reviewing previous notes there was a concern of potential Guillain-Barré.  Patient does note an ascending worsening weakness of the extremities although currently he is able to walk, talk, and generally move without any perceptible difficulty.  This evaluation for tachycardia is outside the scope of today's visit and the patient did not wish to speak about it.  His rapid strep test was negative however given his exposure he would like to initiate treatment in light of his COPD and other symptoms I think that is reasonable.  Above treatment sent to pharmacy.  Recommend immediate follow-up if worsening, recommend follow-up if not showing improvement over the coming 2 to 3 days         Differential diagnosis, natural history, supportive care,  and indications for immediate follow-up discussed.    Advised the patient to follow-up with the primary care physician for recheck, reevaluation, and consideration of further management.    Please note that this dictation was created using voice recognition software. I have made a reasonable attempt to correct obvious errors, but I expect that there are errors of grammar and possibly content that I did not discover before finalizing the note.    This note was electronically signed by Nain Luna PA-C

## 2024-11-15 ENCOUNTER — APPOINTMENT (OUTPATIENT)
Dept: RADIOLOGY | Facility: IMAGING CENTER | Age: 54
End: 2024-11-15
Attending: PHYSICIAN ASSISTANT
Payer: COMMERCIAL

## 2024-11-15 ENCOUNTER — OFFICE VISIT (OUTPATIENT)
Dept: URGENT CARE | Facility: CLINIC | Age: 54
End: 2024-11-15
Payer: COMMERCIAL

## 2024-11-15 VITALS
HEIGHT: 67 IN | TEMPERATURE: 98.3 F | OXYGEN SATURATION: 90 % | HEART RATE: 100 BPM | RESPIRATION RATE: 20 BRPM | SYSTOLIC BLOOD PRESSURE: 126 MMHG | DIASTOLIC BLOOD PRESSURE: 82 MMHG | BODY MASS INDEX: 40.65 KG/M2 | WEIGHT: 259 LBS

## 2024-11-15 DIAGNOSIS — J22 LRTI (LOWER RESPIRATORY TRACT INFECTION): ICD-10-CM

## 2024-11-15 DIAGNOSIS — R09.02 HYPOXIA: ICD-10-CM

## 2024-11-15 DIAGNOSIS — J44.1 COPD EXACERBATION (HCC): ICD-10-CM

## 2024-11-15 PROCEDURE — 99214 OFFICE O/P EST MOD 30 MIN: CPT | Mod: 25 | Performed by: PHYSICIAN ASSISTANT

## 2024-11-15 PROCEDURE — 71046 X-RAY EXAM CHEST 2 VIEWS: CPT | Mod: TC | Performed by: PHYSICIAN ASSISTANT

## 2024-11-15 PROCEDURE — 3074F SYST BP LT 130 MM HG: CPT | Performed by: PHYSICIAN ASSISTANT

## 2024-11-15 PROCEDURE — 94640 AIRWAY INHALATION TREATMENT: CPT | Performed by: PHYSICIAN ASSISTANT

## 2024-11-15 PROCEDURE — 3079F DIAST BP 80-89 MM HG: CPT | Performed by: PHYSICIAN ASSISTANT

## 2024-11-15 RX ORDER — IPRATROPIUM BROMIDE AND ALBUTEROL SULFATE 2.5; .5 MG/3ML; MG/3ML
3 SOLUTION RESPIRATORY (INHALATION) ONCE
Status: COMPLETED | OUTPATIENT
Start: 2024-11-15 | End: 2024-11-15

## 2024-11-15 RX ORDER — TRAZODONE HYDROCHLORIDE 50 MG/1
TABLET, FILM COATED ORAL
COMMUNITY
Start: 2024-10-30

## 2024-11-15 RX ORDER — PREDNISONE 20 MG/1
TABLET ORAL
Qty: 20 TABLET | Refills: 0 | Status: SHIPPED | OUTPATIENT
Start: 2024-11-15

## 2024-11-15 RX ORDER — EMPAGLIFLOZIN 25 MG/1
1 TABLET, FILM COATED ORAL EVERY MORNING
COMMUNITY
Start: 2024-10-30

## 2024-11-15 RX ORDER — BUDESONIDE AND FORMOTEROL FUMARATE DIHYDRATE 160; 4.5 UG/1; UG/1
2 AEROSOL RESPIRATORY (INHALATION) 2 TIMES DAILY
COMMUNITY
Start: 2024-10-30

## 2024-11-15 RX ORDER — ALBUTEROL SULFATE 90 UG/1
2 INHALANT RESPIRATORY (INHALATION) EVERY 6 HOURS PRN
Qty: 8.5 G | Refills: 2 | Status: SHIPPED | OUTPATIENT
Start: 2024-11-15

## 2024-11-15 RX ORDER — BLOOD SUGAR DIAGNOSTIC
STRIP MISCELLANEOUS
COMMUNITY
Start: 2024-10-30

## 2024-11-15 RX ORDER — LANCETS 30 GAUGE
EACH MISCELLANEOUS
COMMUNITY
Start: 2024-10-30

## 2024-11-15 RX ORDER — ATORVASTATIN CALCIUM 20 MG/1
20 TABLET, FILM COATED ORAL
COMMUNITY
Start: 2024-10-30

## 2024-11-15 RX ORDER — GLIPIZIDE 5 MG/1
TABLET, FILM COATED, EXTENDED RELEASE ORAL
COMMUNITY
Start: 2024-10-30

## 2024-11-15 RX ADMIN — IPRATROPIUM BROMIDE AND ALBUTEROL SULFATE 3 ML: 2.5; .5 SOLUTION RESPIRATORY (INHALATION) at 13:05

## 2024-11-15 ASSESSMENT — ENCOUNTER SYMPTOMS
VOMITING: 0
COUGH: 1
FEVER: 0
DIARRHEA: 0
SHORTNESS OF BREATH: 0
SORE THROAT: 0
SPUTUM PRODUCTION: 1
WHEEZING: 1

## 2024-11-15 ASSESSMENT — FIBROSIS 4 INDEX: FIB4 SCORE: 0.81

## 2024-11-15 NOTE — PROGRESS NOTES
"Subjective:   Mikal Aguero  is a 54 y.o. male who presents for Cough (X 2 weeks/ Chest congestion/ phlegm )      Cough  This is a new problem. The current episode started 1 to 4 weeks ago. Associated symptoms include wheezing. Pertinent negatives include no ear pain, fever, rash, sore throat or shortness of breath.   Patient presents urgent care describing last approximate 2 weeks of worsened coughing.  He notes increasing production of purulent phlegm with coughing.  Complains of chest congestion.  Patient denies ear pain or sore throat.  Patient does have a history of COPD and has continued to use maintenance inhaler and has run out of his rescue/albuterol inhaler.  Patient denies history of pneumonia.  He has had lower respiratory tract infections in the past.  Denies vomiting abdominal pain or diarrhea.  Patient has found use of marijuana helpful for as an expectorant to move phlegm out of chest.  He denies chest pain or palpitations.    Review of Systems   Constitutional:  Positive for malaise/fatigue. Negative for fever.   HENT:  Positive for congestion. Negative for ear pain and sore throat.    Respiratory:  Positive for cough, sputum production and wheezing. Negative for shortness of breath.    Gastrointestinal:  Negative for diarrhea and vomiting.   Skin:  Negative for rash.       No Known Allergies     Objective:   /82   Pulse 100   Temp 36.8 °C (98.3 °F) (Temporal)   Resp 20   Ht 1.702 m (5' 7\")   Wt 117 kg (259 lb)   SpO2 90%   BMI 40.57 kg/m²     Physical Exam  Vitals and nursing note reviewed.   Constitutional:       General: He is not in acute distress.     Appearance: Normal appearance. He is well-developed. He is not diaphoretic.   HENT:      Head: Normocephalic and atraumatic.      Right Ear: Tympanic membrane, ear canal and external ear normal.      Left Ear: Tympanic membrane, ear canal and external ear normal.      Nose: Nose normal.      Mouth/Throat:      Mouth: Mucous " membranes are moist.      Pharynx: Uvula midline. Posterior oropharyngeal erythema ( mild PND) present. No oropharyngeal exudate.      Tonsils: No tonsillar abscesses.   Eyes:      General: No scleral icterus.        Right eye: No discharge.         Left eye: No discharge.      Conjunctiva/sclera: Conjunctivae normal.   Pulmonary:      Effort: Pulmonary effort is normal. No accessory muscle usage or respiratory distress.      Breath sounds: Normal breath sounds. No stridor. No decreased breath sounds, wheezing, rhonchi or rales.      Comments: Distant but present lung sounds  Musculoskeletal:         General: Normal range of motion.      Cervical back: Neck supple.   Skin:     General: Skin is warm and dry.      Coloration: Skin is not pale.   Neurological:      Mental Status: He is alert and oriented to person, place, and time.      Coordination: Coordination normal.       Chest x-ray-  FINDINGS:  The cardiomediastinal silhouette is normal in size.  No pulmonary infiltrates or consolidations are noted.  No pleural effusions are appreciated.  No visible pneumothorax.     IMPRESSION:     No radiographic evidence of acute cardiopulmonary disease.        Exam Ended: 11/15/24 11:52 AM Last Resulted: 11/15/24 11:56 AM       DuoNeb - tolerates well    Assessment/Plan:   1. COPD exacerbation (HCC)  - ipratropium-albuterol (DUONEB) nebulizer solution  - DX-CHEST-2 VIEWS; Future    2. Hypoxia  - ipratropium-albuterol (DUONEB) nebulizer solution  - DX-CHEST-2 VIEWS; Future    3. LRTI (lower respiratory tract infection)  - ipratropium-albuterol (DUONEB) nebulizer solution  - DX-CHEST-2 VIEWS; Future    Other orders  - atorvastatin (LIPITOR) 20 MG Tab; Take 20 mg by mouth every day.  - budesonide-formoterol (SYMBICORT) 160-4.5 MCG/ACT Aerosol; Inhale 2 Puffs 2 times a day.  - JARDIANCE 25 MG Tab; Take 1 Tablet by mouth every morning.  - glipiZIDE ER (GLUCOTROL XL) 5 MG TABLET SR 24 HR; TAKE ONE TABLET BY MOUTH EVERY DAY WITH  FOOD  - ONETOUCH ULTRA TEST strip; USE IN vitro TWICE DAILY AS NEEDED TO check blood glucose  - Lancets (ONETOUCH DELICA PLUS [id number]) Misc; USE as directed TO check blood sugar TWICE DAILY  - metformin (GLUCOPHAGE) 1000 MG tablet; Take 1,000 mg by mouth 2 times a day with meals.  - traZODone (DESYREL) 50 MG Tab; TAKE ONE TABLET BY MOUTH AT BEDTIME DAILY AS NEEDED  Supportive care is reviewed with patient/caregiver - recommend to push PO fluids and electrolytes, Cautioned regarding potential side effects of steroid, avoid nsaids while using --we discussed the effect on glucose management of using oral corticosteroids-patient reports appropriate understanding.  Chest x-ray clear is no evidence of bacterial lower respiratory infection.  Return to clinic with lack of resolution or progression of symptoms.      I have worn an N95 mask, gloves and eye protection for the entire encounter with this patient.     Differential diagnosis, natural history, supportive care, and indications for immediate follow-up discussed.

## 2024-12-23 ENCOUNTER — OFFICE VISIT (OUTPATIENT)
Dept: URGENT CARE | Facility: CLINIC | Age: 54
End: 2024-12-23
Payer: COMMERCIAL

## 2024-12-23 ENCOUNTER — APPOINTMENT (OUTPATIENT)
Dept: RADIOLOGY | Facility: MEDICAL CENTER | Age: 54
End: 2024-12-23
Attending: STUDENT IN AN ORGANIZED HEALTH CARE EDUCATION/TRAINING PROGRAM
Payer: COMMERCIAL

## 2024-12-23 ENCOUNTER — HOSPITAL ENCOUNTER (EMERGENCY)
Facility: MEDICAL CENTER | Age: 54
End: 2024-12-23
Attending: STUDENT IN AN ORGANIZED HEALTH CARE EDUCATION/TRAINING PROGRAM
Payer: COMMERCIAL

## 2024-12-23 VITALS
SYSTOLIC BLOOD PRESSURE: 107 MMHG | HEART RATE: 82 BPM | RESPIRATION RATE: 18 BRPM | HEIGHT: 71 IN | DIASTOLIC BLOOD PRESSURE: 81 MMHG | WEIGHT: 230 LBS | OXYGEN SATURATION: 95 % | TEMPERATURE: 98 F | BODY MASS INDEX: 32.2 KG/M2

## 2024-12-23 VITALS
OXYGEN SATURATION: 97 % | TEMPERATURE: 97.2 F | HEIGHT: 71 IN | SYSTOLIC BLOOD PRESSURE: 100 MMHG | WEIGHT: 251.2 LBS | RESPIRATION RATE: 16 BRPM | BODY MASS INDEX: 35.17 KG/M2 | DIASTOLIC BLOOD PRESSURE: 72 MMHG | HEART RATE: 82 BPM

## 2024-12-23 DIAGNOSIS — R07.9 CHEST PAIN, UNSPECIFIED TYPE: ICD-10-CM

## 2024-12-23 DIAGNOSIS — R20.0 LEFT ARM NUMBNESS: ICD-10-CM

## 2024-12-23 DIAGNOSIS — M79.622 LEFT UPPER ARM PAIN: ICD-10-CM

## 2024-12-23 LAB
ALBUMIN SERPL BCP-MCNC: 3.8 G/DL (ref 3.2–4.9)
ALBUMIN/GLOB SERPL: 1.4 G/DL
ALP SERPL-CCNC: 75 U/L (ref 30–99)
ALT SERPL-CCNC: 14 U/L (ref 2–50)
ANION GAP SERPL CALC-SCNC: 12 MMOL/L (ref 7–16)
AST SERPL-CCNC: 13 U/L (ref 12–45)
BASOPHILS # BLD AUTO: 0 % (ref 0–1.8)
BASOPHILS # BLD: 0 K/UL (ref 0–0.12)
BILIRUB SERPL-MCNC: 0.4 MG/DL (ref 0.1–1.5)
BUN SERPL-MCNC: 13 MG/DL (ref 8–22)
CALCIUM ALBUM COR SERPL-MCNC: 8.7 MG/DL (ref 8.5–10.5)
CALCIUM SERPL-MCNC: 8.5 MG/DL (ref 8.5–10.5)
CHLORIDE SERPL-SCNC: 101 MMOL/L (ref 96–112)
CO2 SERPL-SCNC: 21 MMOL/L (ref 20–33)
COMMENT NL1176: NORMAL
CREAT SERPL-MCNC: 0.96 MG/DL (ref 0.5–1.4)
D DIMER PPP IA.FEU-MCNC: 0.79 UG/ML (FEU) (ref 0–0.5)
EOSINOPHIL # BLD AUTO: 0.18 K/UL (ref 0–0.51)
EOSINOPHIL NFR BLD: 2.6 % (ref 0–6.9)
ERYTHROCYTE [DISTWIDTH] IN BLOOD BY AUTOMATED COUNT: 38.3 FL (ref 35.9–50)
GFR SERPLBLD CREATININE-BSD FMLA CKD-EPI: 94 ML/MIN/1.73 M 2
GLOBULIN SER CALC-MCNC: 2.7 G/DL (ref 1.9–3.5)
GLUCOSE SERPL-MCNC: 294 MG/DL (ref 65–99)
HCT VFR BLD AUTO: 46.3 % (ref 42–52)
HGB BLD-MCNC: 15.3 G/DL (ref 14–18)
LYMPHOCYTES # BLD AUTO: 3.03 K/UL (ref 1–4.8)
LYMPHOCYTES NFR BLD: 42.7 % (ref 22–41)
MANUAL DIFF BLD: NORMAL
MCH RBC QN AUTO: 27.5 PG (ref 27–33)
MCHC RBC AUTO-ENTMCNC: 33 G/DL (ref 32.3–36.5)
MCV RBC AUTO: 83.1 FL (ref 81.4–97.8)
METAMYELOCYTES NFR BLD MANUAL: 1.7 %
MICROCYTES BLD QL SMEAR: ABNORMAL
MONOCYTES # BLD AUTO: 0.73 K/UL (ref 0–0.85)
MONOCYTES NFR BLD AUTO: 10.3 % (ref 0–13.4)
MORPHOLOGY BLD-IMP: NORMAL
NEUTROPHILS # BLD AUTO: 3.03 K/UL (ref 1.82–7.42)
NEUTROPHILS NFR BLD: 42.7 % (ref 44–72)
NRBC # BLD AUTO: 0 K/UL
NRBC BLD-RTO: 0 /100 WBC (ref 0–0.2)
NT-PROBNP SERPL IA-MCNC: 53 PG/ML (ref 0–125)
NT-PROBNP SERPL IA-MCNC: 67 PG/ML (ref 0–125)
PLATELET # BLD AUTO: 304 K/UL (ref 164–446)
PLATELET BLD QL SMEAR: NORMAL
PMV BLD AUTO: 10.1 FL (ref 9–12.9)
POIKILOCYTOSIS BLD QL SMEAR: NORMAL
POTASSIUM SERPL-SCNC: 4 MMOL/L (ref 3.6–5.5)
PROT SERPL-MCNC: 6.5 G/DL (ref 6–8.2)
RBC # BLD AUTO: 5.57 M/UL (ref 4.7–6.1)
RBC BLD AUTO: PRESENT
SMUDGE CELLS BLD QL SMEAR: NORMAL
SODIUM SERPL-SCNC: 134 MMOL/L (ref 135–145)
TROPONIN T SERPL-MCNC: 15 NG/L (ref 6–19)
TROPONIN T SERPL-MCNC: 17 NG/L (ref 6–19)
WBC # BLD AUTO: 7.1 K/UL (ref 4.8–10.8)

## 2024-12-23 PROCEDURE — 93005 ELECTROCARDIOGRAM TRACING: CPT | Mod: TC | Performed by: STUDENT IN AN ORGANIZED HEALTH CARE EDUCATION/TRAINING PROGRAM

## 2024-12-23 PROCEDURE — 71045 X-RAY EXAM CHEST 1 VIEW: CPT

## 2024-12-23 PROCEDURE — 700117 HCHG RX CONTRAST REV CODE 255: Mod: UD | Performed by: STUDENT IN AN ORGANIZED HEALTH CARE EDUCATION/TRAINING PROGRAM

## 2024-12-23 PROCEDURE — 3074F SYST BP LT 130 MM HG: CPT | Performed by: PHYSICIAN ASSISTANT

## 2024-12-23 PROCEDURE — 83880 ASSAY OF NATRIURETIC PEPTIDE: CPT

## 2024-12-23 PROCEDURE — 80053 COMPREHEN METABOLIC PANEL: CPT

## 2024-12-23 PROCEDURE — 85007 BL SMEAR W/DIFF WBC COUNT: CPT

## 2024-12-23 PROCEDURE — 71275 CT ANGIOGRAPHY CHEST: CPT

## 2024-12-23 PROCEDURE — 85027 COMPLETE CBC AUTOMATED: CPT

## 2024-12-23 PROCEDURE — 99215 OFFICE O/P EST HI 40 MIN: CPT | Performed by: PHYSICIAN ASSISTANT

## 2024-12-23 PROCEDURE — 36415 COLL VENOUS BLD VENIPUNCTURE: CPT

## 2024-12-23 PROCEDURE — 85379 FIBRIN DEGRADATION QUANT: CPT

## 2024-12-23 PROCEDURE — 84484 ASSAY OF TROPONIN QUANT: CPT

## 2024-12-23 PROCEDURE — 3078F DIAST BP <80 MM HG: CPT | Performed by: PHYSICIAN ASSISTANT

## 2024-12-23 PROCEDURE — 99284 EMERGENCY DEPT VISIT MOD MDM: CPT

## 2024-12-23 RX ADMIN — IOHEXOL 55 ML: 350 INJECTION, SOLUTION INTRAVENOUS at 22:30

## 2024-12-23 ASSESSMENT — FIBROSIS 4 INDEX
FIB4 SCORE: 0.81
FIB4 SCORE: 0.81

## 2024-12-23 ASSESSMENT — PAIN DESCRIPTION - PAIN TYPE: TYPE: ACUTE PAIN

## 2024-12-24 ASSESSMENT — HEART SCORE
ECG: NORMAL
AGE: 45-64
RISK FACTORS: >2 RISK FACTORS OR HX OF ATHEROSCLEROTIC DISEASE
HEART SCORE: 3
HISTORY: SLIGHTLY SUSPICIOUS
TROPONIN: LESS THAN OR EQUAL TO NORMAL LIMIT

## 2024-12-24 NOTE — ED NOTES
"Mikal Aguero has been discharged from the Emergency Room.    IV discontinued and gauze bandage placed, pt in possession of belongings.    Discharge instructions, which include signs and symptoms to monitor patient for, as well as detailed information regarding chest pain provided.  Patient verbalizes understanding of follow up care and medication management. All questions and concerns addressed at this time.     Patient provided with education on when to return to the ER and verbally understands with no concerns. Patient advised on setting up MyChart and information provided about patient survey.  Patient leaves ER in no apparent distress. This RN provided education regarding returning to the ER for any new concerns or changes in patient's condition.      /81   Pulse 82   Temp 36.7 °C (98 °F) (Temporal)   Resp 18   Ht 1.803 m (5' 11\")   Wt 104 kg (230 lb)   SpO2 95%   BMI 32.08 kg/m²    "

## 2024-12-24 NOTE — ED TRIAGE NOTES
Mikal Aguero  54 y.o. male    Chief Complaint   Patient presents with    Chest Pain     Pt states for as long as he can remember his left arm has hurt but in the last couple of days he started having left side chest pain which has increased the arm pain.     Arm Pain     Pt ambulatory to triage for above complaint. Pt states pain is a stabbing pain. Pt A&O x4, VSS.    Vitals:    12/23/24 1934   BP: 129/76   Pulse: 79   Resp: 18   Temp: 36.4 °C (97.5 °F)   SpO2: 96%       Triage process explained to patient, apologized for wait time, and returned to [facility].  Pt informed to notify staff of any change in condition.

## 2024-12-24 NOTE — ED NOTES
This RN attempted to obtain repeat trop, pt asked to speak with ERP about discharge due to needing to go home for family reasons

## 2024-12-24 NOTE — ED PROVIDER NOTES
ED Provider Note    CHIEF COMPLAINT  Chief Complaint   Patient presents with    Chest Pain     Pt states for as long as he can remember his left arm has hurt but in the last couple of days he started having left side chest pain which has increased the arm pain.     Arm Pain       EXTERNAL RECORDS REVIEWED  Outpatient Notes patient was seen by family practitioner today for evaluation of left-sided arm pain and chest pain.  Patient was sent to the ER for further evaluation.    HPI/ROS  LIMITATION TO HISTORY   Select: : None      Mikal Aguero is a 54 y.o. male who presents to the emergency department for evaluation of chest pain and arm pain.  The patient reports for the last 3 days he has had intermittent episodes of chest discomfort described as a left-sided sharp pain.  This typically lasts for couple of minutes before resolving.  He states that occasionally this gets worse with exertion but otherwise occurs very randomly.  He denies any specific additional exacerbating or relieving factors.  He reports no specific radiation of the pain in his neck or to his shoulders or back.  He denies abdominal pain.  He also notes that for as long as he can remember he has had some numbness and tingling and paresthesias to his left forearm wrist and hand.  He states that these are random as well in terms of exacerbation but he does not know anything in specific which makes them worse.  He denies weakness of the fingers and can still use his hand normally.  He denies fevers or chills, shortness of breath, swelling of the arm, swelling of his legs.  Denies history of DVT or PE.  He denies any chronic cardiovascular history.  He does report a prior history of smoking but quit 13 days ago.    PAST MEDICAL HISTORY   Diabetes, hyperlipidemia    SURGICAL HISTORY   has a past surgical history that includes other orthopedic surgery (Right).    FAMILY HISTORY  Family History   Problem Relation Age of Onset    Hyperlipidemia Mother   "   Hypertension Mother     Cancer Neg Hx     Lung Disease Neg Hx     Diabetes Neg Hx     Heart Disease Neg Hx        SOCIAL HISTORY  Social History     Tobacco Use    Smoking status: Former     Current packs/day: 1.00     Average packs/day: 1 pack/day for 3.0 years (3.0 ttl pk-yrs)     Types: Cigarettes    Smokeless tobacco: Never   Vaping Use    Vaping status: Some Days    Substances: Nicotine   Substance and Sexual Activity    Alcohol use: Not Currently     Alcohol/week: 0.6 oz     Types: 1 Shots of liquor per week    Drug use: Yes     Types: Marijuana, Inhaled    Sexual activity: Not on file       CURRENT MEDICATIONS  Home Medications       Reviewed by Marilyn Broussard R.N. (Registered Nurse) on 12/23/24 at 1942  Med List Status: Not Addressed     Medication Last Dose Status   albuterol 108 (90 Base) MCG/ACT Aero Soln inhalation aerosol  Active   atorvastatin (LIPITOR) 20 MG Tab  Active   budesonide-formoterol (SYMBICORT) 160-4.5 MCG/ACT Aerosol  Active   glipiZIDE ER (GLUCOTROL XL) 5 MG TABLET SR 24 HR  Active   JARDIANCE 25 MG Tab  Active   Lancets (ONETOUCH DELICA PLUS [id number]) Misc  Active   metformin (GLUCOPHAGE) 1000 MG tablet  Active   ONETOUCH ULTRA TEST strip  Active   sildenafil citrate (VIAGRA) 50 MG tablet  Active   traZODone (DESYREL) 50 MG Tab  Active                    ALLERGIES  No Known Allergies    PHYSICAL EXAM  VITAL SIGNS: /81   Pulse 82   Temp 36.7 °C (98 °F) (Temporal)   Resp 18   Ht 1.803 m (5' 11\")   Wt 104 kg (230 lb)   SpO2 95%   BMI 32.08 kg/m²    Constitutional: No acute distress, pleasant and well-appearing  HEENT: Atraumatic, normocephalic, pupils are equal round reactive to light, nose normal, mouth shows moist mucous membranes  Neck: Supple, no JVD, no tracheal deviation  Cardiovascular: Regular rate and rhythm, no murmur, rub or gallop, 2+ pulses peripherally x4  Thorax & Lungs: No respiratory distress, no wheezes, rales or rhonchi, no chest wall " tenderness.  GI: Soft, non-distended, non-tender, no rebound  Skin: Warm, dry, no acute rash or lesion  Musculoskeletal: Moving all extremities, no acute deformity, no edema, no tenderness  Neurologic: A&Ox3, at baseline mentation, left upper extremity axillary median radial and ulnar nerve distribution sensation and strength intact.  Psychiatric: Appropriate affect for situation at this time          EKG/LABS  Labs Reviewed   CBC WITH DIFFERENTIAL - Abnormal; Notable for the following components:       Result Value    Neutrophils-Polys 42.70 (*)     Lymphocytes 42.70 (*)     All other components within normal limits   COMP METABOLIC PANEL - Abnormal; Notable for the following components:    Sodium 134 (*)     Glucose 294 (*)     All other components within normal limits   D-DIMER - Abnormal; Notable for the following components:    D-Dimer 0.79 (*)     All other components within normal limits   PROBRAIN NATRIURETIC PEPTIDE, NT   TROPONIN   ESTIMATED GFR   DIFFERENTIAL MANUAL   PERIPHERAL SMEAR REVIEW   PLATELET ESTIMATE   MORPHOLOGY   PROBRAIN NATRIURETIC PEPTIDE, NT   TROPONIN     Results for orders placed or performed during the hospital encounter of 22   EKG   Result Value Ref Range    Report       [facility] Emergency Dept.    Test Date:  2022  Pt Name:    FRANSISCA MILLER                  Department: ER  MRN:        [id number]                      Room:        14 H  Gender:     ZOHRA                            Technician: [id number]  :        1970                   Requested By:AMADOU TENORIO  Order #:    [id number]                    Reading MD: RUBA CUEVA MD    Measurements  Intervals                                Axis  Rate:       75                           P:          80  MA:         152                          QRS:        -14  QRSD:       98                           T:          42  QT:         424  QTc:        474    Interpretive Statements  SINUS RHYTHM  PROBABLE  LEFT ATRIAL ABNORMALITY  No previous ECG available for comparison  Electronically Signed On 4- 21:36:21 PDT by RUBA CUEVA MD       EKG not crossing over annual Epiphany.  Demonstrates left atrial normality.  Otherwise sinus rhythm without acute ischemia.  I have independently interpreted this EKG    RADIOLOGY/PROCEDURES   I have independently interpreted the diagnostic imaging associated with this visit and am waiting the final reading from the radiologist.   My preliminary interpretation is as follows: CTA with no large pulmonary embolism.  Pulmonary parenchyma are normal with no pneumonia.     Radiologist interpretation:  CT-CTA CHEST PULMONARY ARTERY W/ RECONS   Final Result         1.  No pulmonary embolus appreciated.   2.  Hepatomegaly      DX-CHEST-PORTABLE (1 VIEW)   Final Result         1.  Hazy bilateral lower lobe opacity suggesting subtle infiltrates, greater on the right          COURSE & MEDICAL DECISION MAKING    ASSESSMENT, COURSE AND PLAN  Care Narrative:     Very pleasant 54-year-old male with a history of diabetes, hyperlipidemia, obesity, tobacco use presents to the ER for evaluation of 3 days of waxing and waning left-sided chest discomfort.  No high risk historical features associated with the pain including pressure-like sensation, exertional nature, vomiting or radiation.  He is pain-free at this time.  His initial EKG does not demonstrate any acute ischemia or STEMI.  Will plan for serial troponins to further assess.  With respect to his left arm pain this is a chronic condition and likely related to cubital tunnel syndrome.  Recommended elbow bracing trial and outpatient primary care follow-up if no better.  Do not feel it is necessarily related to his chest discomfort currently.  Considered PE but patient is not tachycardic nor hypoxic or short of breath and has no evidence of DVT on exam nor additional risk factors for such.  Symptom constellation not suggestive of aortic  dissection.  Doubt pneumothorax but will screen with chest x-ray.    Chest x-ray interestingly demonstrating bilateral infiltrates.  On repeat evaluation patient is now endorsing that he has had a chronic cough for quite some time but that 2 days ago he coughed up a small amount of blood.  This in the setting of his chest pain is certainly much more concerning for pulmonary embolism which I discussed.  Will plan to add on D-dimer but I do feel patient necessitates CT angiography of his chest to further elucidate these infiltrates.  Remainder of his laboratory testing at this point is unremarkable including a normal initial troponin.  Will obtain repeat.    CT scan with no PE.  Likewise chest radiograph findings are not demonstrated on CT scan with no evidence of pneumonia or infiltrative disease.  Subsequent troponin testing is negative.  Patient originally requesting discharge home and I feel comfortable with him returning home given his low risk heart score of 3.  I recommended outpatient follow-up with cardiology and his primary care doctor and a referral was placed and follow-up information provided.  Strict return precautions discussed all questions answered he was discharged stable condition.    CHEST PAIN:   HEART Score for Major Cardiac Events  HEART Score     History: Slightly suspicious  ECG: Normal  Age: 45-64  Risk Factors: >2 risk factors or hx of atherosclerotic disease  Troponin: Less than or equal to normal limit    Heart Score: 3    Total Score   0-3 Points = Low Score, risk of MACE 0.9-1.7%.  4-6 Points = Moderate Score, risk of MACE 12-16.6%  7-10 Points = High Score, risk of MACE 50-65%          ADDITIONAL PROBLEMS MANAGED  None    DISPOSITION AND DISCUSSIONS  I have discussed management of the patient with the following physicians and LEONARDO's: None    Discussion of management with other QHP or appropriate source(s): None     Escalation of care considered, and ultimately not performed:acute  inpatient care management, however at this time, the patient is most appropriate for outpatient management    Decision tools and prescription drugs considered including, but not limited to: Heart score as above.  Cannot exclude PE based on PERC criteria and D-dimer is nonnegative.    FINAL DIAGNOSIS  1. Chest pain, unspecified type         Electronically signed by: Pedro Levy M.D., 12/23/2024 8:18 PM

## 2024-12-24 NOTE — DISCHARGE INSTRUCTIONS
Please call the attached number to schedule a follow-up appointment with cardiology.  You should also follow-up with your primary care doctor for recheck.  Return to the ER immediately if you develop any worsening chest pain or pressure, shortness of breath, dizziness, lightheadedness, if you pass out or otherwise feeling worse.

## 2024-12-24 NOTE — PROGRESS NOTES
"Subjective:   Mikal Aguero is a 54 y.o. male who presents for Arm Pain (X2-4 days, left chest/axillary area to the left arm. States that it was numb. States still having pain and numbness)      HPI  The patient is a 54-year-old male with a history of diabetes presents to the Urgent Care with complaints of 2 to 3 days of left upper arm pain and numbness.  Left hand tingling is constant.  Also reports of a sharp pain to his left sided chest and lower abdomen.  Chest pain is intermittent.  When he does feel the chest pain it is 9/10 in severity.  Also reports of pain with deep breaths.  The chest pain is exacerbated with feeling frustrated or overworked.  He was sweating yesterday.  He does sleep on his left side but has the symptoms throughout the day when he is not sleeping.  Sister passed away of MI at an early age.  Patient denies history of cardiovascular disease.  Denies any dizziness.  No other complaints.      No past medical history on file.  No Known Allergies     Objective:     /72 (BP Location: Right arm, Patient Position: Sitting, BP Cuff Size: Large adult)   Pulse 82   Temp 36.2 °C (97.2 °F)   Resp 16   Ht 1.803 m (5' 11\")   Wt 114 kg (251 lb 3.2 oz)   SpO2 97%   BMI 35.04 kg/m²     Physical Exam  Vitals reviewed.   Constitutional:       General: He is not in acute distress.     Appearance: Normal appearance. He is not ill-appearing or toxic-appearing.   HENT:      Mouth/Throat:      Mouth: Mucous membranes are moist.      Pharynx: Oropharynx is clear.   Eyes:      Conjunctiva/sclera: Conjunctivae normal.   Cardiovascular:      Rate and Rhythm: Normal rate and regular rhythm.      Heart sounds: Normal heart sounds.   Pulmonary:      Effort: Pulmonary effort is normal. No respiratory distress.      Breath sounds: Normal breath sounds. No wheezing, rhonchi or rales.   Musculoskeletal:      Cervical back: Neck supple. No rigidity.   Skin:     General: Skin is warm and dry.   Neurological: "      General: No focal deficit present.      Mental Status: He is alert and oriented to person, place, and time.   Psychiatric:         Mood and Affect: Mood normal.         Behavior: Behavior normal.         Diagnosis and associated orders:     1. Chest pain, unspecified type    2. Left upper arm pain    3. Left arm numbness       Comments/MDM:     This is a pleasant 54-year-old male with a history of diabetes who presents with complaints of intermittent left-sided sharp chest pains, left upper arm pain, and left upper extremity numbness and tingling for the past 2 to 3 days.  See full history above.  The patient is very well-appearing in no acute distress.  Normal vital signs.  Examination is benign.  I do have concern for possible cardiac etiology or DVT.  I do feel patient requires further evaluation.  Therefore, it was highly recommended he present immediately to the ER for higher level of evaluation and care.  The patient verbalized understanding and agreed to plan.  He had no other questions.  He is going to present across the street to the [facility] ER via private vehicle.  He is with his significant other.  He is otherwise stable.  I called transfer center and report given awaiting patient arrival.         Please note that this dictation was created using voice recognition software. I have made a reasonable attempt to correct obvious errors, but I expect that there are errors of grammar and possibly content that I did not discover before finalizing the note.    This note was electronically signed by Omar Collins PA-C

## 2025-03-09 ENCOUNTER — OFFICE VISIT (OUTPATIENT)
Dept: URGENT CARE | Facility: CLINIC | Age: 55
End: 2025-03-09
Payer: COMMERCIAL

## 2025-03-09 ENCOUNTER — APPOINTMENT (OUTPATIENT)
Dept: URGENT CARE | Facility: CLINIC | Age: 55
End: 2025-03-09

## 2025-03-09 VITALS
WEIGHT: 271.7 LBS | RESPIRATION RATE: 18 BRPM | HEART RATE: 82 BPM | SYSTOLIC BLOOD PRESSURE: 128 MMHG | OXYGEN SATURATION: 97 % | BODY MASS INDEX: 38.9 KG/M2 | HEIGHT: 70 IN | DIASTOLIC BLOOD PRESSURE: 78 MMHG | TEMPERATURE: 98.9 F

## 2025-03-09 ASSESSMENT — FIBROSIS 4 INDEX: FIB4 SCORE: 0.72

## 2025-03-11 ENCOUNTER — OFFICE VISIT (OUTPATIENT)
Dept: URGENT CARE | Facility: CLINIC | Age: 55
End: 2025-03-11
Payer: COMMERCIAL

## 2025-03-11 VITALS
DIASTOLIC BLOOD PRESSURE: 74 MMHG | HEIGHT: 70 IN | OXYGEN SATURATION: 92 % | HEART RATE: 96 BPM | RESPIRATION RATE: 20 BRPM | SYSTOLIC BLOOD PRESSURE: 132 MMHG | TEMPERATURE: 98.1 F | WEIGHT: 271 LBS | BODY MASS INDEX: 38.8 KG/M2

## 2025-03-11 DIAGNOSIS — B97.89 VIRAL SINUSITIS: ICD-10-CM

## 2025-03-11 DIAGNOSIS — H60.391 OTHER INFECTIVE ACUTE OTITIS EXTERNA OF RIGHT EAR: ICD-10-CM

## 2025-03-11 DIAGNOSIS — J32.9 VIRAL SINUSITIS: ICD-10-CM

## 2025-03-11 PROCEDURE — 99213 OFFICE O/P EST LOW 20 MIN: CPT | Performed by: STUDENT IN AN ORGANIZED HEALTH CARE EDUCATION/TRAINING PROGRAM

## 2025-03-11 PROCEDURE — 3078F DIAST BP <80 MM HG: CPT | Performed by: STUDENT IN AN ORGANIZED HEALTH CARE EDUCATION/TRAINING PROGRAM

## 2025-03-11 PROCEDURE — 3075F SYST BP GE 130 - 139MM HG: CPT | Performed by: STUDENT IN AN ORGANIZED HEALTH CARE EDUCATION/TRAINING PROGRAM

## 2025-03-11 RX ORDER — OFLOXACIN 3 MG/ML
5 SOLUTION AURICULAR (OTIC) DAILY
Qty: 10 ML | Refills: 0 | Status: SHIPPED | OUTPATIENT
Start: 2025-03-11 | End: 2025-03-16

## 2025-03-11 ASSESSMENT — ENCOUNTER SYMPTOMS
FEVER: 0
COUGH: 1
CHILLS: 0

## 2025-03-11 ASSESSMENT — FIBROSIS 4 INDEX: FIB4 SCORE: 0.72

## 2025-03-11 NOTE — PROGRESS NOTES
Subjective:   Mikal Aguero is a 54 y.o. male who presents for Cough (Productive cough, green mucus, x 4 days )      HPI:  54-year-old male presents with 2 concerns.    1. being itchy right ear for the past week or so.  2.  Significant sinus congestion and cough that is productive of green sputum.  No significant ear pain no significant throat pain unless he is coughing.  No shortness of breath unless he has a coughing episode.  Cough is worse at night when he is lying down.  He has no significant sick contacts.  He reports the mucus is only while he is lying down on his back he has significant postnasal drip and sinus congestion and pressure.  This been happening for approximately 4 days.  No fevers no chills. Musinex mild help      Review of Systems   Constitutional:  Negative for chills and fever.   HENT:  Positive for congestion.    Respiratory:  Positive for cough.        Medications:    albuterol Aers  atorvastatin Tabs  budesonide-formoterol Aero  glipiZIDE ER Tb24  Jardiance Tabs  metformin  OneTouch Delica Plus [id number] Misc  OneTouch Ultra Test Strp  sildenafil citrate  traZODone Tabs    Allergies: Patient has no known allergies.    Problem List: Mikal Aguero does not have any pertinent problems on file.    Surgical History:  Past Surgical History:   Procedure Laterality Date    OTHER ORTHOPEDIC SURGERY Right        Past Social Hx: Mikal Aguero  reports that he has quit smoking. His smoking use included cigarettes. He has a 3 pack-year smoking history. He has never used smokeless tobacco. He reports that he does not currently use alcohol after a past usage of about 0.6 oz of alcohol per week. He reports current drug use. Drugs: Marijuana and Inhaled.     Past Family Hx:  Mikal Aguero family history includes Hyperlipidemia in his mother; Hypertension in his mother.     Problem list, medications, and allergies reviewed by myself today in Epic.     Objective:     /74   Pulse 96   " Temp 36.7 °C (98.1 °F)   Resp 20   Ht 1.778 m (5' 10\")   Wt 123 kg (271 lb)   SpO2 92%   BMI 38.88 kg/m²     Physical Exam  Constitutional:       Appearance: Normal appearance.   HENT:      Head: Normocephalic and atraumatic.      Right Ear: Tympanic membrane normal.      Left Ear: Tympanic membrane normal.      Ears:      Comments: Right external ear canal erythematous.     Nose: Congestion and rhinorrhea present.      Mouth/Throat:      Pharynx: No oropharyngeal exudate or posterior oropharyngeal erythema.   Eyes:      Extraocular Movements: Extraocular movements intact.      Conjunctiva/sclera: Conjunctivae normal.   Cardiovascular:      Rate and Rhythm: Normal rate and regular rhythm.      Pulses: Normal pulses.      Heart sounds: Normal heart sounds.   Pulmonary:      Effort: Pulmonary effort is normal. No respiratory distress.      Breath sounds: Normal breath sounds. No wheezing.   Neurological:      Mental Status: He is alert.         Assessment/Plan:     Diagnosis and associated orders:     1. Other infective acute otitis externa of right ear  ofloxacin otic sol (FLOXIN OTIC) 0.3 % Solution      2. Viral sinusitis           Comments/MDM:     1. Other infective acute otitis externa of right ear  Patient's right ear with appearance of otitis externa.  Will treat with ofloxacin otic drops 5 drops in the affected ear once daily for 5 days.  - Patient instructed on return precautions including significant worsening of his ear pain discomfort and development of fever chills any change in hearing.    2. Viral sinusitis    Symptomatic Management  - Nasal decongestants (e.g., pseudoephedrine 30 mg every 4-6 hours as tolerated) for congestion.  - Saline nasal irrigation: Recommend using saline spray or neti pot to flush out nasal passages.  - Intranasal corticosteroids (e.g., Fluticasone nasal spray) to reduce inflammation, especially if there is a history of allergies.  - Pain relief: Recommend acetaminophen " or ibuprofen for pain and fever.  -Warm humidified air or steam inhalation for relief for congestion- Use of once daily antihistamine.  -Follow up in 7-10 days or sooner if worsening    Education     - Advise the patient on proper use of nasal sprays and decongestants to avoid overuse.     - Discuss the importance of completing the full course of antibiotics to prevent resistance and relapse.     - Encourage adequate hydration and rest.     - Advise avoiding triggers such as smoking or environmental irritants.    Patient educated on on signs of complications (e.g., fever, worsening headache, facial swelling, vision changes) and when to seek further medical attention.             Differential diagnosis, natural history, supportive care, and indications for immediate follow-up discussed.    Advised the patient to follow-up with the primary care physician for recheck, reevaluation, and consideration of further management.    Please note that this dictation was created using voice recognition software. I have made a reasonable attempt to correct obvious errors, but I expect that there are errors of grammar and possibly content that I did not discover before finalizing the note.    Chris Garcia M.D.